# Patient Record
Sex: MALE | Race: BLACK OR AFRICAN AMERICAN | Employment: STUDENT | URBAN - METROPOLITAN AREA
[De-identification: names, ages, dates, MRNs, and addresses within clinical notes are randomized per-mention and may not be internally consistent; named-entity substitution may affect disease eponyms.]

---
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
## 2023-06-13 NOTE — TELEPHONE ENCOUNTER
Please review. Protocol failed / Has no protocol.      Requested Prescriptions   Pending Prescriptions Disp Refills    ALBUTEROL 108 (90 Base) MCG/ACT Inhalation Aero Soln [Pharmacy Med Name: ALBUTEROL HFA INH (200 PUFFS) 8.5GM] 8.5 g 0     Sig: INHALE 1 PUFF INTO THE LUNGS EVERY 6 HOURS AS NEEDED FOR WHEEZING       Asthma & COPD Medication Protocol Failed - 6/12/2023  4:00 PM        Failed - In person appointment or virtual visit in the past 6 mos or appointment in next 3 mos     Recent Outpatient Visits              9 months ago Strain of calf muscle, right, subsequent encounter    [facility]    Office Visit    10 months ago Bilateral impacted cerumen    [facility], [address] [facility],[address] [facility], Ftrk-Dzdvpi-KrjddrhWindy Lerma MD    Office Visit    11 months ago Major depressive disorder, recurrent, mild [facility])    [facility], [address], [facility] iRckey Quarles MD    Office Visit    12 months ago Strain of calf muscle, right, initial encounter    [facility]    Office Visit    1 year ago Abscess    [facility], [address], [facility] Rickey Quarles MD    Office Visit          Future Appointments         Provider Department Appt Notes    In 1 month Carlos Vasquez MD [address], [address] [facility],[address] [facility], [place] 1 year f/u                  Future Appointments         Provider Department Appt Notes    In 1 month Carlos Vasquez MD [address], [address] [facility],[address], [place] 1 year f/u           Recent Outpatient Visits              9 months ago Strain of calf muscle, right, subsequent encounter    [address] [facility] YeaddissCésar egan MD    Office Visit    10 months ago Bilateral impacted cerumen    [address], [address] [facility],[address] [facility], [place] Carlos Vasquez MD    Office Visit    11 months ago Major depressive disorder, recurrent, mild (HCC)    Brenden Kumar MD    Office Visit    12 months ago Strain of calf muscle, right, initial encounter    [address] [facility]    Office Visit    1 year ago Abscess    Brenden Kumar MD    Office Visit

## 2023-06-13 NOTE — ED INITIAL ASSESSMENT (HPI)
Pt to ED with c/o left flank/rib pain after trauma while playing football 2 days ago. Pt states pain with inspiration and any movement. No respiratory distress noted. Pt ambulating by self with steady gait. Chest expansion equal. Bilateral breath sounds noted. Pt is alert and oriented x4. Pt skin parameters WNL.

## 2023-08-04 NOTE — TELEPHONE ENCOUNTER
Refill passed per [facility], [facility] protocol. Requested Prescriptions   Pending Prescriptions Disp Refills    albuterol (2.5 MG/3ML) 0.083% Inhalation Nebu Soln 1 each 3     Sig: Take 3 mL (2.5 mg total) by nebulization every 6 (six) hours as needed for Wheezing. Asthma & COPD Medication Protocol Passed - 8/4/2023 10:12 AM        Passed - In person appointment or virtual visit in the past 6 mos or appointment in next 3 mos     Recent Outpatient Visits              Yesterday Routine health maintenance    [address] [place] [address] Laura Brizuela MD    Office Visit    Yesterday Bilateral impacted cerumen    [facility], [address], Roe Hernandez MD    Office Visit    1 month ago Concussion without loss of consciousness, subsequent encounter    [address], [address], [facility] Laura Brizuela MD    Office Visit    11 months ago Strain of calf muscle, right, subsequent encounter    [address], [address], Gerson Rush MD    Office Visit    1 year ago Bilateral impacted cerumen    [facility], [address], [place] Dominick Lyons MD    Office Visit          Future Appointments         Provider Department Appt Notes    In 3 weeks Laura Brizuela MD [address] [place] [address] two month follow up    In 6 months Dominick Lyons MD [facility], [address], [place] 6 mths                 albuterol 108 (90 Base) MCG/ACT Inhalation Aero Soln 8.5 g 0     Sig: Inhale 1 puff into the lungs every 6 (six) hours as needed for Wheezing.        Asthma & COPD Medication Protocol Passed - 8/4/2023 10:12 AM        Passed - In person appointment or virtual visit in the past 6 mos or appointment in next 3 mos     Recent Outpatient Visits              Yesterday Routine health maintenance    [address] [place] [address], [address] [place] [address], Akosua Machuca MD    Office Visit    Yesterday Bilateral impacted cerumen    [facility], [address], Pavan Hernandez MD    Office Visit    1 month ago Concussion without loss of consciousness, subsequent encounter    [address], [address], [facility] Donald Mullins MD    Office Visit    11 months ago Strain of calf muscle, right, subsequent encounter    Adri Stafford Stewart Coyer, MD    Office Visit    1 year ago Bilateral impacted cerumen    [facility], [address], Pavan Hernandez MD    Office Visit          Future Appointments         Provider Department Appt Notes    In 3 weeks MD Jose Ramon Zaldivar, [facility] two month follow up    In 6 months Darwin Colbert MD [facility], [address], [place] 6 mths                 Budesonide-Formoterol Fumarate 160-4.5 MCG/ACT Inhalation Aerosol 10.2 g 0     Sig: Inhale 2 puffs into the lungs 2 (two) times daily.        Asthma & COPD Medication Protocol Passed - 8/4/2023 10:12 AM        Passed - In person appointment or virtual visit in the past 6 mos or appointment in next 3 mos     Recent Outpatient Visits              Yesterday Routine health maintenance    [address], Cathie Jones MD    Office Visit    Yesterday Bilateral impacted cerumen    [address], [address], Pavan Hernandez MD    Office Visit    1 month ago Concussion without loss of consciousness, subsequent encounter    [address], Cathie Jones MD    Office Visit    11 months ago Strain of calf muscle, right, subsequent encounter    Adri Stafford Stewart Coyer, MD    Office Visit    1 year ago Bilateral impacted cerumen    [address], [address], Krish lAas MD    Office Visit          Future Appointments         Provider Department Appt Notes    In 3 weeks MD Zackery Butler [place] two month follow up    In 6 months Lang Elias MD [facility], [address], [place] 6 mths                 loratadine 10 MG Oral Tab 90 tablet 11     Sig: Take 1 tablet (10 mg total) by mouth daily. Allergy Medication Protocol Passed - 8/4/2023 10:12 AM        Passed - In person appointment or virtual visit in the past 12 mos or appointment in next 3 mos     Recent Outpatient Visits              Yesterday Routine health maintenance    Farrah Panda MD    Office Visit    Yesterday Bilateral impacted cerumen    [facility], [address], [place], Alize Masterson MD    Office Visit    1 month ago Concussion without loss of consciousness, subsequent encounter    [address], [address], [place] Angel Fine MD    Office Visit    11 months ago Strain of calf muscle, right, subsequent encounter    [address], [address], [place], Lorenza Valentine MD    Office Visit    1 year ago Bilateral impacted cerumen    [facility], [address], [place] Lang Elias MD    Office Visit          Future Appointments         Provider Department Appt Notes    In 3 weeks MD Zackery Butler [place] two month follow up    In 6 months Lang Elias MD [facility], [address], [place] 6 mths                 montelukast 10 MG Oral Tab 90 tablet 3     Sig: Take 1 tablet (10 mg total) by mouth nightly.        Asthma & COPD Medication Protocol Passed - 8/4/2023 10:12 AM        Passed - In person appointment or virtual visit in the past 6 mos or appointment in next 3 mos     Recent Outpatient Visits Yesterday Routine health maintenance    [address] Clark Larose MD    Office Visit    Yesterday Bilateral impacted cerumen    [facility], [address], Kaylen Hernandez MD    Office Visit    1 month ago Concussion without loss of consciousness, subsequent encounter    [address], [address], [address] Clark Larose MD    Office Visit    11 months ago Strain of calf muscle, right, subsequent encounter    [address], [address], [place], Nely De La Paz MD    Office Visit    1 year ago Bilateral impacted cerumen    [facility], [address], Kaylen Hernandez MD    Office Visit          Future Appointments         Provider Department Appt Notes    In 3 weeks MD Y[facility]ier Pascual, [facility] two month follow up    In 6 months MD Yunier Valdez, [facility] 6 mths                  Recent Outpatient Visits              Yesterday Routine health maintenance    [address], [address], [address] Clark Larose MD    Office Visit    Yesterday Bilateral impacted cerumen    [facility], [address], Kaylen Hernandez MD    Office Visit    1 month ago Concussion without loss of consciousness, subsequent encounter    [address], [address], [address] Clark Larose MD    Office Visit    11 months ago Strain of calf muscle, right, subsequent encounter    [address], [address], [place], Nely De La Paz MD    Office Visit    1 year ago Bilateral impacted cerumen    [facility], [address], Kaylen Hernandez MD    Office Visit          Future Appointments         Provider Department Appt Notes    In 3 weeks Clark Larose MD [address], [place] [address], [place] [address] two month follow up    In 6 months MD [facility], [address], [place] [facility]

## 2023-08-14 NOTE — TELEPHONE ENCOUNTER
Mother is requesting a copy of patients immunization record. Mother states they are on the way to  from office.

## 2023-08-14 NOTE — TELEPHONE ENCOUNTER
Patient states that he just received notice that he will be moving into the [facility]. Need form completed ASAP. Form placed in dr Haven Conn in box at the [facility]  8-14-23 for dr Johan Peck to sign.

## 2023-08-15 NOTE — TELEPHONE ENCOUNTER
Left message on patients VM, will need Men B vaccine, school is recommending that.  Can get vaccine when he is

## 2023-08-16 NOTE — TELEPHONE ENCOUNTER
Patient is already away at school in [place], mother will pick form up, but will schedule nurse visit to get vaccine. Form placed up at .

## 2023-09-15 NOTE — DISCHARGE INSTRUCTIONS
Your X-rays today do not show any breaks/fractures  We will treat your injury as an ankle and foot sprain, crust injury of foot  Elevate leg when sitting, Rest ankle and foot to help healing process  Continue use of Ortho Boot, until you follow up with Podiatry or Orthopedic Specialist

## 2023-09-15 NOTE — ED INITIAL ASSESSMENT (HPI)
Pt states was seen a week ago for a leg injury in another IC. Pt received minimal information and was unsure of his official status of his injury. Pt states here for reevaluation.

## 2023-10-11 NOTE — TELEPHONE ENCOUNTER
Refill passed per [place] [facility] [place] protocol. Requested Prescriptions   Pending Prescriptions Disp Refills    SERTRALINE 50 MG Oral Tab [Pharmacy Med Name: SERTRALINE 50MG TABLETS] 90 tablet 3     Sig: TAKE 1 TABLET(50 MG) BY MOUTH DAILY       Psychiatric Non-Scheduled (Anti-Anxiety) Passed - 10/10/2023 10:19 AM        Passed - In person appointment or virtual visit in the past 6 mos or appointment in next 3 mos     Recent Outpatient Visits              2 months ago Routine health maintenance    [address], Haja Acosta MD    Office Visit    2 months ago Bilateral impacted [place], [address], [place], Braxton Schwab, MD    Office Visit    3 months ago Concussion without loss of consciousness, subsequent encounter    [address], Haja Acosta MD    Office Visit    1 year ago Strain of calf muscle, right, subsequent encounter    [address] [facility], [place], Shannon Antonio MD    Office Visit    1 year ago Bilateral impacted [place], [address], [place], Braxton Schwab, MD    Office Visit          Future Appointments         Provider Department Appt Notes    In 2 weeks ADO MRI RM1 (1.5T) [facility] - [place] Sched w/mom; athlete injury, use AGA claim info, ask mom about [address] secondary?   MB  Please call Ana Luong at [facility] if any questions:  [phone]   MB    In 4 months Macy Khalil MD [address] 6 mths                         Recent Outpatient Visits              2 months ago Routine health maintenance    Diane Jackson MD    Office Visit    2 months ago Bilateral impacted cerumen    [address], [address], MD Olu    Office Visit    3 months ago Concussion without loss of consciousness, subsequent encounter    Verena Salguero MD    Office Visit    1 year ago Strain of calf muscle, right, subsequent encounter    [place], Lorenza Valentine MD    Office Visit    1 year ago Bilateral impacted Lisa Bound, [address], [place], Alize Masterson MD    Office Visit          Future Appointments         Provider Department Appt Notes    In 2 weeks ADO MRI RM1 (1.5T) [facility] - [place] Sched w/mom; athlete injury, use AGA claim info, ask mom about [address] secondary?   MB  Please call Flumes at [facility] if any questions:  [phone]   MB    In 4 months Lang Elias MD [facility] Medical [facility], [address], [place] 6 mths

## 2023-10-12 NOTE — TELEPHONE ENCOUNTER
I have created this encounter to have an up to date scanning cover sheet for the pt's received report from [facility]. I have sent the report to the scanning team so that it can be uploaded into the pt's chart.

## 2023-10-18 NOTE — TELEPHONE ENCOUNTER
Referral for Dr. Ephraim Redd office was faxed over to [phone], with confirmation, mother was called and notified.

## 2023-10-18 NOTE — TELEPHONE ENCOUNTER
Patient's mom stopped in the [facility]. Rajeev needs a referral to Dr. Osvaldo Sherman, he has an appointment today at 2:00pm. Portia Bean is having pain in both eyes, discharge and blurry vision. He was told by Dr. Juan Francisco Ha office to come in today as an emergency appointment. Can a referral please be issued today and faxed to Dr. Juan Francisco Ha office?

## 2023-10-26 NOTE — ANESTHESIA PROCEDURE NOTES
Airway  Date/Time: 10/26/2023 9:54 AM  Urgency: Elective      General Information and Staff    Patient location during procedure: OR  Anesthesiologist: Lo Reyes MD  Performed: anesthesiologist   Performed by: Lo Reyes MD  Authorized by: Lo Reyes MD      Indications and Patient Condition  Indications for airway management: anesthesia  Sedation level: deep  Preoxygenated: yes  Patient position: sniffing  Mask difficulty assessment: 2 - vent by mask + OA or adjuvant +/- NMBA    Final Airway Details  Final airway type: supraglottic airway      Successful airway: classic  Size 4       Number of attempts at approach: 1  Number of other approaches attempted: 0    Additional Comments  Easy mask, easy LMA insertion

## 2023-10-26 NOTE — OPERATIVE REPORT
Alfonso Marley    PATIENT'S NAME: Ruddy Lee, [address] [facility], [place]   ATTENDING PHYSICIAN: Robb Mejia DO   OPERATING PHYSICIAN: Holly Munoz   PATIENT ACCOUNT#:   [de-identified]    LOCATION:  [address]  MEDICAL RECORD #:   [id number]       YOB: 2005  ADMISSION DATE:       10/26/2023      OPERATION DATE:  10/26/2023    OPERATIVE REPORT      PREOPERATIVE DIAGNOSIS:  Left foot Lisfranc fracture, tarsometatarsal subluxation. POSTOPERATIVE DIAGNOSIS:  Left foot Lisfranc fracture, tarsometatarsal subluxation. PROCEDURE:    1. Open reduction and internal fixation of the tarsometatarsal joint, left side. 2.   Open reduction and internal fixation of the inner tarsal joint, left side. ASSISTANT:  Nixon Pretty PA-C, who was essential in aiding in reduction, assisting in repair, in order to facilitate surgery and preserve the neurovascular bundle. ANESTHESIA:  General with a regional block. ESTIMATED BLOOD LOSS:  5 mL. IMPLANTS:  Synthes 4.0 cannulated headless screw x2. SPECIMENS:  None. COMPLICATIONS:  None. CONDITION:  Stable to PACU. INDICATIONS:  The patient is a 23-year-old male who plays football and injured his foot while playing football. He injured his Lisfranc ligament. He was found to have plantar tearing of the ligament and had continued pain. He failed conservative management including wearing a boot and activity modifications and wished to proceed with surgery. All risks, benefits and alternatives were explained to the patient including, but not limited to, DVT, infection, wound-healing problems, bone-healing problems, neurovascular damage, risk of continued pain, risk of weakness, risk of loss of strength and decreased ability in playing football. The patient understood and wished to proceed with the above procedure.       OPERATIVE TECHNIQUE:  The patient was met in the preoperative holding area and marked with an indelible marker. He was brought back to the operative suite, placed on the operating table in supine position. There, anesthesia was administered by department of anesthesia. Once he was adequately sedated, a well-padded left thigh tourniquet was placed, and he was prepped and draped in the usual sterile fashion. A time-out was performed. All in the room were in agreement with patient, procedure, and site of procedure. Preoperative antibiotics were administered by the department of anesthesia prior to incision. The limb was exsanguinated and tourniquet inflated to 250 mmHg and remained inflated for approximately 33 minutes. LEFT FOOT OPEN REDUCTION AND INTERNAL FIXATION OF THE TARSOMETATARSAL JOINT:  We made a dorsal incision over the Lisfranc joint and bluntly dissected down. Synovitis was removed from the joint, and a reduction was made with a pointed reduction clamp. Once it was well reduced, a K-wire was inserted from the medial aspect of the medial cuneiform into the second metatarsal base. The position was checked under fluoroscopy. Once we liked our position, we made an incision medially; bluntly dissected down; measured, drilled and inserted a 4.0 cannulated headless screw. Excellent stability was found at the joint after this reduction. OPEN REDUCTION AND INTERNAL FIXATION OF INNER TARSAL JOINT:  Through a dorsal incision, we dissected down, and I identified the inner cuneiform joint. The joint was stabilized and pinned in position with a K-wire. The position was checked under fluoroscopy. Once we liked our position, an incision was made medially, and blunt dissection was carried down. We drilled, measured, and inserted a 4.0 cannulated headless screw. Excellent compression was achieved across our joint surface. Under fluoroscopy, the joint was checked for stability and found to be stable.       Our wounds were thoroughly irrigated, and our incisions were closed using 2-0 Vicryl for the retinacular tissue, as well as the subcutaneous tissue, and a running 3-0 Monocryl for subcuticular closure. A sterile dressing was placed consisting of Xeroform, 4 x 4, ABD, Webril, and a bulky Mcfadden sugar-tong splint. The tourniquet had been deflated, and the toes pinked up nicely. The patient was awoken from anesthesia and brought to the PACU in stable condition and will be nonweightbearing through his left lower extremity, receive DVT prophylaxis, and will follow up in the office in 1 week.     Dictated By Deana Roldan DO  d: 10/26/2023 10:43:39  t: 10/26/2023 18:27:53  Job [id number]  ISAI/

## 2023-10-26 NOTE — H&P
[address] [facility] Patient Status:  [facility]patient Surgery    1/15/2005 MRN [id number]   Location [address] [facility] Attending Thang Mom, [address] [facility] Day # 0 PCP Mehrdad Cai MD       Jet Faust is a 19year old male with a history of a left lisfranc injury while playing football. He has failed conservative management and presents today for surgical management. Allergies:   Cat Hair Extract        ANAPHYLAXIS  Egg                     HIVES, UNKNOWN    Comment:Per pt: No longer have an allergy 23 rmv  Peanuts                 HIVES  Peanuts [Peanut Oil]    HIVES  Tree Nuts               HIVES  Dog Dander [Dander]       Dust Mites              OTHER (SEE COMMENTS)    Comment:sneezing  Seasonal                Coughing    Past Medical History:   Diagnosis Date    Asthma     CP (cerebral palsy) (HCC)     mild per mom    CRPS (complex regional pain syndrome type I)     Depression     Environmental and seasonal allergies     Extrinsic asthma, unspecified     Otitis media     S/P myringotomy with insertion of tube ,,       Height 6' 1\" (1.854 m), weight 220 lb (99.8 kg). Review of Systems  Negative other than stated in HPI     Physical Exam  General: Awake and alert, resting comfortably in no acute distress   HEENT: NCAT, EOMI   Respiratory: Non labored breathing   Cardiac: No peripheral edema   GI: Non distended, non tender abdomen   Musculoskeletal: Tenderness to palpation dorsum of left foot at 1st and 2nd tarsometatarsal joints. SILT. DF/PF/EHL intact.     Assessment:  19year old male with left foot tarsometatarsal joint sprain     Plan:  Angelica Burnham to proceed with procedure as planned     Brooks Azul PA-C  10/26/2023

## 2023-10-26 NOTE — ANESTHESIA PROCEDURE NOTES
Peripheral Block    Date/Time: 10/26/2023 9:32 AM    Performed by: Lew Reyes MD  Authorized by: Lew Reyes MD      General Information and Staff    Start Time:  10/26/2023 9:32 AM  End Time:  10/26/2023 9:35 AM  Anesthesiologist:  Lew Reyes MD  Performed by: Anesthesiologist  Patient Location:  PACU    Block Placement: Pre Induction  Site Identification: real time ultrasound guided and image stored and retrievable      Reason for Block: at surgeon's request and post-op pain management    Preanesthetic Checklist: 2 patient identifers, IV checked, site marked, risks and benefits discussed, monitors and equipment checked, pre-op evaluation, timeout performed, anesthesia consent, sterile technique used, no prohibitive neurological deficits and no local skin infection at insertion site      Procedure Details    Patient Position:  Right lateral decubitus  Prep: ChloraPrep and patient draped    Monitoring:  Cardiac monitor  Block Type:  Popliteal  Laterality:  Left  Injection Technique:  Single-shot    Needle    Needle Type:  Short-bevel  Needle Gauge:  22 G  Needle Length:  100 mm  Needle Localization:  Ultrasound guidance and nerve stimulator  Reason for Ultrasound Use: appropriate spread of the medication was noted in real time and no ultrasound evidence of intravascular and/or intraneural injection      Muscle Twitch Response: dorsiflexion      Assessment    Injection Assessment:  Good spread noted, incremental injection, local visualized surrounding nerve on ultrasound, low pressure, negative aspiration for heme and no pain on injection  Paresthesia Pain:  None  Heart Rate Change: No    - Patient tolerated block procedure well without evidence of immediate block related complications.      Medications  10/26/2023 9:32 AM      Additional Comments

## 2023-10-26 NOTE — ANESTHESIA PROCEDURE NOTES
Peripheral Block    Date/Time: 10/26/2023 9:35 AM    Performed by: Jose E Reyes MD  Authorized by: Jose E Reyes MD      General Information and Staff    Start Time:  10/26/2023 9:35 AM  End Time:  10/26/2023 9:37 AM  Anesthesiologist:  Jose E Reyes MD  Performed by: Anesthesiologist  Patient Location:  PACU    Block Placement: Pre Induction  Site Identification: real time ultrasound guided and image stored and retrievable      Reason for Block: at surgeon's request and post-op pain management    Preanesthetic Checklist: 2 patient identifers, IV checked, site marked, risks and benefits discussed, monitors and equipment checked, pre-op evaluation, timeout performed, anesthesia consent, sterile technique used, no prohibitive neurological deficits and no local skin infection at insertion site      Procedure Details    Patient Position:  Supine  Prep: ChloraPrep and patient draped    Monitoring:  Cardiac monitor, blood pressure cuff, continuous pulse ox and heart rate  Block Type:  Saphenous  Laterality:  Left  Injection Technique:  Single-shot    Needle    Needle Type:  Short-bevel  Needle Gauge:  22 G  Needle Length:  100 mm  Needle Localization:  Ultrasound guidance  Reason for Ultrasound Use: appropriate spread of the medication was noted in real time and no ultrasound evidence of intravascular and/or intraneural injection            Assessment    Injection Assessment:  Good spread noted, incremental injection, low pressure, local visualized surrounding nerve on ultrasound, negative aspiration for heme and no pain on injection  Paresthesia Pain:  None  Heart Rate Change: No    - Patient tolerated block procedure well without evidence of immediate block related complications.      Medications  10/26/2023 9:35 AM      Additional Comments

## 2023-10-26 NOTE — BRIEF OP NOTE
Pre-Operative Diagnosis: Tarsometatarsal joint sprain left     Post-Operative Diagnosis: Tarsometatarsal joint sprain left      Procedure Performed:   Left foot open reduction internal fixation intercuneiform and lisfranc joint    Surgeon(s) and Role:     Phyllis Michelle DO - Trudi    Assistant(s):  PA: Yaritza Murrieta PA-C     Surgical Findings: fracture/dislocation     Specimen: n/a     Estimated Blood Loss: 5 cc    Dictation Number:  [id number]    Debby Barajas PA-C  10/26/2023  10:37 AM

## 2024-03-11 NOTE — ED PROVIDER NOTES
Patient Seen in: [facility]      History     Chief Complaint   Patient presents with    Cough/URI     Stated Complaint: Cough/Weakness    Subjective:   19-year-old male with medical conditions as noted below presents with complaints of intermittent nasal congestion and non prod cough x 1 week. States symptoms resolved but then returned yesterday. No improvement from pseudoephedrine. States so of feeling like his heart was racing for a few minutes yesterday.  Resolved on its own.  Mom is concerned for possible blood clot because he recently had hardware in his foot removed, there is also a family history.  Patient returning to college tomorrow and mom wants to make sure everything is okay before he goes back.  No fever/chills, chest pain, shortness of breath, abdominal pain, nausea/vomiting/diarrhea, shortness of breath on exertion            Objective:   Past Medical History:   Diagnosis Date    Asthma (HCC)     CP (cerebral palsy) (HCC)     mild per mom    CRPS (complex regional pain syndrome type I)     Depression     Environmental and seasonal allergies     Extrinsic asthma, unspecified     Otitis media     S/P myringotomy with insertion of tube 2007,2008,2009              Past Surgical History:   Procedure Laterality Date    ADENOIDECTOMY  2007    MASTECTOMY,SUBCUTANEOUS Bilateral 08/18/2020    T&A  2008    TONSILLECTOMY                  Social History     Socioeconomic History    Marital status: Single   Occupational History    Occupation: student    Tobacco Use    Smoking status: Never    Smokeless tobacco: Never   Vaping Use    Vaping Use: Never used   Substance and Sexual Activity    Alcohol use: Never    Drug use: Never   Other Topics Concern    Caffeine Concern No    History of tanning No              Review of Systems   Constitutional:  Negative for chills and fever.   HENT:  Positive for congestion. Negative for sore throat.    Respiratory:  Positive for cough. Negative for shortness of  breath.    Cardiovascular:  Positive for palpitations (x 1 episode that resolved on its own). Negative for chest pain.   Gastrointestinal:  Negative for abdominal pain, diarrhea, nausea and vomiting.   Neurological:  Negative for headaches.   All other systems reviewed and are negative.      Positive for stated complaint: Cough/Weakness  Other systems are as noted in HPI.  Constitutional and vital signs reviewed.      All other systems reviewed and negative except as noted above.    Physical Exam     ED Triage Vitals [03/11/24 1742]   /72   Pulse 75   Resp 18   Temp 97.7 °F (36.5 °C)   Temp src Temporal   SpO2 96 %   O2 Device None (Room air)       Current:/72   Pulse 75   Temp 97.7 °F (36.5 °C) (Temporal)   Resp 18   SpO2 96%         Physical Exam  Vitals and nursing note reviewed.   Constitutional:       General: He is not in acute distress.     Appearance: Normal appearance. He is not ill-appearing.   HENT:      Head: Normocephalic.      Right Ear: Tympanic membrane and external ear normal.      Left Ear: Tympanic membrane and external ear normal.      Nose: Nose normal.      Mouth/Throat:      Mouth: Mucous membranes are moist.      Pharynx: Oropharynx is clear. Uvula midline.      Tonsils: No tonsillar exudate.   Cardiovascular:      Rate and Rhythm: Normal rate and regular rhythm.   Pulmonary:      Effort: Pulmonary effort is normal.      Breath sounds: Normal breath sounds.   Musculoskeletal:         General: Normal range of motion.      Cervical back: Normal range of motion and neck supple.   Skin:     General: Skin is warm and dry.   Neurological:      Mental Status: He is alert and oriented to person, place, and time.   Psychiatric:         Behavior: Behavior is cooperative.               ED Course     Labs Reviewed   D-DIMER (POC) - Normal   RAPID SARS-COV-2 BY PCR - Normal   POCT FLU TEST - Normal    Narrative:     This assay is a rapid molecular in vitro test utilizing nucleic acid  amplification of influenza A and B viral RNA.                      MDM                                         Medical Decision Making  Patient is well-appearing.  Respirations easy nonlabored.  No tachycardia at this time  I discussed differentials with patient including but not limited to viral uri vs PE.  Rapid COVID and Influenza negative  Discussed with patient and mother low suspicion for PE.  Will do a D-dimer and if positive will send patient to emergency department.  D-dimer normal  Push fluids, cool mist humidifier, good hand washing  otc meds prn  Fu with PCP. Return/ ED precautions discussed      Problems Addressed:  Viral URI with cough: acute illness or injury    Amount and/or Complexity of Data Reviewed  Labs: ordered. Decision-making details documented in ED Course.        Disposition and Plan     Clinical Impression:  1. Viral URI with cough         Disposition:  Discharge  3/11/2024  7:34 pm    Follow-up:  Pat Conn MD  [address] [place] [id number]  [phone]    Schedule an appointment as soon as possible for a visit             Medications Prescribed:  Discharge Medication List as of 3/11/2024  7:36 PM

## 2024-03-11 NOTE — ED INITIAL ASSESSMENT (HPI)
Sinus congestion and cough x 1 week.  Heading back to college and wants to be checked before going back.

## 2024-05-23 NOTE — PROGRESS NOTES
Rajeev Traore is a 19 year old male.  Chief Complaint   Patient presents with    Post-Op     F/u surgery on left foot       HPI:   Patient here for follow-up foot surgery on left side.  Patient had an injury during football last fall.  Had a repair still continuing with physical therapy.  Physical therapist feels more physical therapy working the Achilles tendon would be helpful at this time    Current Outpatient Medications   Medication Sig Dispense Refill    VITAMIN D OR Take 1 tablet by mouth daily.      triamcinolone 0.1 % External Ointment Apply 1 Application topically 2 (two) times daily. apply to affected area 453.6 g 1    HYDROcodone-acetaminophen 7.5-325 MG Oral Tab Take 1 tablet by mouth every 6 (six) hours as needed for Pain. (Patient not taking: Reported on 3/11/2024) 28 tablet 0    docusate sodium 100 MG Oral Cap Take 1 capsule (100 mg total) by mouth 2 (two) times daily as needed. (Patient not taking: Reported on 3/11/2024) 20 capsule 0    aspirin 81 MG Oral Tab EC Take 1 tablet (81 mg total) by mouth in the morning and 1 tablet (81 mg total) before bedtime. (Patient not taking: Reported on 3/11/2024) 60 tablet 0    Cyanocobalamin (VITAMIN B 12 OR) Take 1 tablet by mouth daily. (Patient not taking: Reported on 3/11/2024)      sertraline 50 MG Oral Tab Take 1 tablet (50 mg total) by mouth daily. 90 tablet 3    albuterol 108 (90 Base) MCG/ACT Inhalation Aero Soln Inhale 1 puff into the lungs every 6 (six) hours as needed for Wheezing. (Patient not taking: Reported on 3/11/2024) 3 each 3    Budesonide-Formoterol Fumarate 160-4.5 MCG/ACT Inhalation Aerosol Inhale 2 puffs into the lungs 2 (two) times daily. (Patient not taking: Reported on 3/11/2024) 3 each 3    albuterol (2.5 MG/3ML) 0.083% Inhalation Nebu Soln Take 3 mL (2.5 mg total) by nebulization every 6 (six) hours as needed for Wheezing. (Patient not taking: Reported on 3/11/2024) 1 each 3    montelukast 10 MG Oral Tab Take 1 tablet  (10 mg total) by mouth nightly. 90 tablet 3    loratadine 10 MG Oral Tab Take 1 tablet (10 mg total) by mouth daily. 90 tablet 11    fluticasone propionate 50 MCG/ACT Nasal Suspension 2 sprays by Nasal route daily. 16 g 0    Spacer/Aero-Holding Chambers Does not apply Device Use with hfa inhaler as directed (Patient not taking: Reported on 3/11/2024) 1 Device 0      Past Medical History:    Asthma (HCC)    CP (cerebral palsy) (HCC)    mild per mom    CRPS (complex regional pain syndrome type I)    Depression    Environmental and seasonal allergies    Extrinsic asthma, unspecified    Otitis media    S/P myringotomy with insertion of tube      Past Surgical History:   Procedure Laterality Date    Adenoidectomy  2007    Mastectomy,subcutaneous Bilateral 08/18/2020    T&a  2008    Tonsillectomy        Social History:  Social History     Socioeconomic History    Marital status: Single   Occupational History    Occupation: student    Tobacco Use    Smoking status: Never    Smokeless tobacco: Never   Vaping Use    Vaping status: Never Used   Substance and Sexual Activity    Alcohol use: Never    Drug use: Never   Other Topics Concern    Caffeine Concern No    History of tanning No     Social Determinants of Health      [facility]    Social Connections    [facility]    Housing Stability        REVIEW OF SYSTEMS:   GENERAL HEALTH: No fevers, chills, sweats, fatigue  VISION: No recent vision problems, blurry vision or double vision  HEENT: No decreased hearing ear pain nasal congestion or sore throat  SKIN: denies any unusual skin lesions or rashes  RESPIRATORY: denies shortness of breath, cough, wheezing  CARDIOVASCULAR: denies chest pain on exertion, palpitations, swelling in feet  GI: denies abdominal pain and denies heartburn, nausea or vomiting  : No Pain on urination, change in the color  of urine, discharge, urinating frequently  MUS: No back pain, joint pain, muscle pain  NEURO: denies headaches , anxiety, depression    EXAM:   /73 (BP Location: Right arm, Patient Position: Sitting, Cuff Size: large)   Pulse 76   Wt 258 lb (117 kg)   BMI 34.99 kg/m²   GENERAL: well developed, well nourished,in no apparent distress        ASSESSMENT AND PLAN:   1. Injury of left ankle, sequela  Physical therapy referral given to continue physical therapy for pain relief and function  - Physical Therapy Referral - External       The patient indicates understanding of these issues and agrees to the plan.  No follow-ups on file.

## 2024-07-15 NOTE — ED PROVIDER NOTES
Patient Seen in: [facility]      History     Chief Complaint   Patient presents with    Cough     Stated Complaint: cough    Subjective:   HPI    Patient is 19-year-old male with environmental seasonal allergies, extrinsic asthma, accompanied by mother, presenting to immediate care for evaluation of cold/flulike symptoms.  Onset: 3 days.  Associated: Subjective fever, chills, nasal congestion, and nonproductive cough.  Taking Mucinex and [place] for cold symptoms.  Symptoms slightly improving.  Denies any chest pain or shortness of breath.  No lethargy, weakness, confusion.  Mother with recent COVID-19 virus infection 2 weeks ago.  Here for COVID testing.  Not immunocompromise    Objective:   Past Medical History:    Asthma (HCC)    CP (cerebral palsy) (HCC)    mild per mom    CRPS (complex regional pain syndrome type I)    Depression    Environmental and seasonal allergies    Extrinsic asthma, unspecified    Otitis media    S/P myringotomy with insertion of tube              Past Surgical History:   Procedure Laterality Date    Adenoidectomy  2007    Mastectomy,subcutaneous Bilateral 08/18/2020    T&a  2008    Tonsillectomy                  Social History     Socioeconomic History    Marital status: Single   Occupational History    Occupation: student    Tobacco Use    Smoking status: Never    Smokeless tobacco: Never   Vaping Use    Vaping status: Never Used   Substance and Sexual Activity    Alcohol use: Never    Drug use: Never   Other Topics Concern    Caffeine Concern No    History of tanning No     Social Determinants of Health      [facility]    Social Connections    [facility] from [facility], [facility]    Housing Stability              Review of Systems   Constitutional:  Positive for chills and fever.   Respiratory:  Positive for cough. Negative for shortness of breath.     Cardiovascular:  Negative for chest pain.   Gastrointestinal:  Negative for abdominal pain, nausea and vomiting.   Musculoskeletal:  Negative for neck pain and neck stiffness.   Allergic/Immunologic: Negative for immunocompromised state.   Psychiatric/Behavioral:  Negative for confusion.        Positive for stated Chief Complaint: Cough    Other systems are as noted in HPI.  Constitutional and vital signs reviewed.      All other systems reviewed and negative except as noted above.    Physical Exam     ED Triage Vitals [07/15/24 1200]   /89   Pulse 80   Resp 18   Temp 97.7 °F (36.5 °C)   Temp src Oral   SpO2 100 %   O2 Device None (Room air)       Current Vitals:   Vital Signs  BP: 150/89  Pulse: 80  Resp: 18  Temp: 97.7 °F (36.5 °C)  Temp src: Oral    Oxygen Therapy  SpO2: 100 %  O2 Device: None (Room air)            Physical Exam  Vitals and nursing note reviewed.   Constitutional:       General: He is not in acute distress.     Appearance: He is not toxic-appearing.   HENT:      Head: Normocephalic and atraumatic.      Right Ear: Tympanic membrane normal.      Left Ear: Tympanic membrane normal.      Nose: Congestion and rhinorrhea present.      Mouth/Throat:      Mouth: Mucous membranes are moist.      Pharynx: No oropharyngeal exudate.      Comments: Postnasal drip  Eyes:      Conjunctiva/sclera: Conjunctivae normal.   Cardiovascular:      Rate and Rhythm: Normal rate.      Pulses: Normal pulses.   Pulmonary:      Effort: Pulmonary effort is normal. No respiratory distress.      Breath sounds: Normal breath sounds. No wheezing, rhonchi or rales.      Comments: Clear to auscultation bilaterally.  No rales or wheezing  Musculoskeletal:         General: No swelling or tenderness. Normal range of motion.      Cervical back: Normal range of motion. No rigidity.   Skin:     Capillary Refill: Capillary refill takes less than 2 seconds.      Findings: No rash.   Neurological:      General: No focal deficit  present.      Mental Status: He is alert and oriented to person, place, and time.      Motor: No weakness.      Gait: Gait normal.             ED Course     Labs Reviewed   RAPID SARS-COV-2 BY PCR - Abnormal; Notable for the following components:       Result Value    Rapid SARS-CoV-2 by PCR Detected (*)     All other components within normal limits     Results for orders placed or performed during the hospital encounter of 07/15/24   Rapid SARS-CoV-2 by PCR    Collection Time: 07/15/24 12:03 PM    Specimen: Nares; Other   Result Value Ref Range    Rapid SARS-CoV-2 by PCR Detected (A) Not Detected            MDM     Dx: COVID Virus Infection, Initial Encounter  Onset: 3 days  COVID PCR Positive  No severe disease  No hypoxia  Not requiring hospitalization  Lungs: Clear to Auscultation Bilaterally  No chest pain or shortness of breath  Overall well-appearing  Hemodynamically stable  Afebrile  Tolerating PO  Outpatient management  Supportive care  Quarantine/Isolation  Rest  Oral hydration  Motrin/Tylenol as needed for pain/fever/myalgias  Tessalon Mucinex DM for Anti-tussive  Claritin and Flonase for nasal/sinus congestion  Shared decision making- deferred Paxlovid oral antiviral for Covid infection  PCP follow  ED Return precaution  Discharge instructions COVID                                 Medical Decision Making      Disposition and Plan     Clinical Impression:  1. COVID-19 virus infection    2. Encounter for laboratory testing for COVID-19 virus    3. Acute cough         Disposition:  Discharge  7/15/2024 12:28 pm    Follow-up:  Pat Conn MD  [address] [place] [id number]  [phone]                Medications Prescribed:  Current Discharge Medication List        START taking these medications    Details   benzonatate 100 MG Oral Cap Take 1 capsule (100 mg total) by mouth 3 (three) times daily as needed for cough.  Qty: 30 capsule, Refills: 0

## 2024-07-15 NOTE — DISCHARGE INSTRUCTIONS
Motrin/Tylenol as needed for pain/fever/myalgias  Tessalon Mucinex DM for Anti-tussive  Claritin and Flonase for nasal/sinus congestion

## 2024-08-01 NOTE — ED INITIAL ASSESSMENT (HPI)
Patient states he fell today prior to arrival, when asked if LOC or mechanical fall. Patient states \"just lost control, I couldn't get up for 5 minute. Patient also complaining of swelling over left eye. Denies hitting head during fall. No notable swelling or redness around left eye

## 2024-08-01 NOTE — ED PROVIDER NOTES
Patient Seen in: [facility] Emergency Department    History     Chief Complaint   Patient presents with    Headache       HPI    19 year old male here with third day of bifrontal headache, not maximal in onset, not associated with any emesis or vision changes or diplopia.  No neck stiffness.  No trauma.  Nonexertional at onset.  No focal weakness or numbness or paresthesias    History reviewed.   Past Medical History:    Asthma (HCC)    CP (cerebral palsy) (HCC)    mild per mom    CRPS (complex regional pain syndrome type I)    Depression    Environmental and seasonal allergies    Extrinsic asthma, unspecified    Otitis media    S/P myringotomy with insertion of tube       History reviewed.   Past Surgical History:   Procedure Laterality Date    Adenoidectomy  2007    Mastectomy,subcutaneous Bilateral 08/18/2020    T&a  2008    Tonsillectomy           Medications :  (Not in a hospital admission)       Family History   Problem Relation Age of Onset    Diabetes Mother     Asthma Mother     Prostate Cancer Maternal Grandfather     Prostate Cancer Paternal Grandfather     Hypertension Other     Diabetes Other        Smoking Status:   Social History     Socioeconomic History    Marital status: Single   Occupational History    Occupation: student    Tobacco Use    Smoking status: Never    Smokeless tobacco: Never   Vaping Use    Vaping status: Never Used   Substance and Sexual Activity    Alcohol use: Never    Drug use: Never   Other Topics Concern    Caffeine Concern No    History of tanning No       Constitutional and vital signs reviewed.      Social History and Family History elements reviewed from today, pertinent positives to the presenting problem noted.    Physical Exam     ED Triage Vitals [08/01/24 0155]   /81   Pulse 74   Resp 18   Temp 98.2 °F (36.8 °C)   Temp src Oral   SpO2 98 %   O2 Device None (Room air)       All measures to prevent infection transmission during my interaction with the  patient were taken. The patient was already wearing a droplet mask on my arrival to the room. Personal protective equipment was worn throughout the duration of the exam.  Handwashing was performed prior to and after the exam.  Stethoscope and any equipment used during my examination was cleaned with super sani-cloth germicidal wipes following the exam.     Physical Exam    General: NAD  Head: Normocephalic and atraumatic.  Mouth/Throat/Ears/Nose: Oropharynx is clear and moist.   Eyes: Conjunctivae and EOM are normal.   Neck: Normal range of motion. Supple.   Cardiovascular: Normal rate, regular rhythm, normal heart sounds.  Respiratory/Chest: Clear and equal bilaterally. Exhibits no tenderness.  Gastrointestinal: Soft, non-tender, non-distended. Bowel sounds are normal.   Musculoskeletal:No swelling or deformity.   Neurological: Alert and appropriate. No focal deficits. AOx4  CN II-XII grossly intact  5/5 strength: Left  strength, deltoid abduction, achilles, patella  5/5 strength: Right  strength, deltoid abduction, achilles, patella   SILT to bilateral upper and lower extremities   normal gait . Normal FTN, HTS, no dysdiadochokinesis.     Skin: Skin is warm and dry. No pallor.  Psychiatric: Has a normal mood and affect.      ED Course      Labs Reviewed - No data to display    As Interpreted by me    Imaging Results Available and Reviewed while in ED: No results found.  ED Medications Administered:   Medications   ketorolac (Toradol) 30 MG/ML injection 30 mg (30 mg Intramuscular Given 8/1/24 0248)   acetaminophen (Tylenol Extra Strength) tab 1,000 mg (1,000 mg Oral Given 8/1/24 0248)         MDM     Vitals:    08/01/24 0155 08/01/24 0447   BP: 138/81 134/82   Pulse: 74 76   Resp: 18 18   Temp: 98.2 °F (36.8 °C)    TempSrc: Oral    SpO2: 98% 99%   Weight: 133.8 kg    Height: 182.9 cm (6')      *I personally reviewed and interpreted all ED vitals.    Pulse Ox: 98%, Room air, Normal       Medical Decision  Making      Differential Diagnosis/ Diagnostic Considerations: Migraine, tension headache, subarachnoid hemorrhage, meningitis    Complicating Factors: The patient already has does not have any pertinent problems on file. to contribute to the complexity of this ED evaluation.    I reviewed prior chart records including from July 15, 2024.  Patient here with headache inconsistent with subarachnoid hemorrhage or meningitis, no red flags or neurologic deficits or meningismus.  Feels improved after supportive care.  Discharged home with mother who is also comfortable with the discharge plan.  Discharged in stable condition  Prescriptions: Ibuprofen, tylenol OTC for pain    Disposition and Plan     Clinical Impression:  1. Nonintractable headache, unspecified chronicity pattern, unspecified headache type        Disposition:  Discharge    Follow-up:  Pat Conn MD  [address] [place], [place] [id number] [phone]    Schedule an appointment as soon as possible for a visit in 1 day(s)        Medications Prescribed:  Discharge Medication List as of 8/1/2024  3:31 AM

## 2024-08-01 NOTE — ED INITIAL ASSESSMENT (HPI)
Complaints of headache x2 days worsening tonight. Denies any injuries. Hx of concussion 1 year ago.

## 2024-08-14 NOTE — TELEPHONE ENCOUNTER
Mom asking to  a copy of patient's vacccine record and the test he is negative for Sick Cell.  Call her ready for  at  in [facility]  [phone]  Okay to leave a voicemail

## 2024-11-01 NOTE — ED INITIAL ASSESSMENT (HPI)
Patient presents with large abscess to right side of his of his penis. Per patient, area started off as a \" knot\" 3 weeks ago and today began to drain. Wound is pink in color, draining light yellow pus.  Denies fevers, however, also is COVID + and has col Pre-Appointment Document Gathering    Intake Questions:  Does your child have any existing medical conditions or prior hospitalizations? yes  Have they been evaluated in the past either by a clinician, mental health provider, or school? yes  What are you looking for from this evaluation? Re eval with Dr. Villegas        Intake Screeening:  Appointment Type Placement: Neuropsych Return  Wait time quote (if applicable): Scheduled immediately   Rationale/Notes:      *if scheduling with a psychiatry or ASD psychiatry prescriber please fill out MIDBMTM smartphrase to determine if scheduling with MTM is needed*      Logistics:  Patient would like to receive their intake paperwork via doxo  Email consent? yes  What is the patient's preferred language?   Will the family need an ? no    Intake Paperwork Documentation  Document  Date sent to family Date received and sent to scanning   MIDB Demographics []    ROIs to Collect []    ROIs/Consent to communicate as indicated by ROIs to Collect form []    Medical History []    School and Intervention History []    Behavioral and Mental Health History []    Questionnaires (indicate type in the sent/received column)    *Please check for Teacher JERRY before sending teacher forms [] BAS Parent     [] [facility] Teacher*     [] BRIEF Parent     [] BRIEF Teacher*     [] [facility] Parent     [] [facility] Teacher*     [] Other:      Release of Information Collection / Records received  *If records received from a location without an JERRY on file please still document receipt in this chart*  School/Service/Therapist/etc.  Family Returned signed JERRY Sent Request Received/Sent to HIM scanning Where in the chart?

## 2024-11-19 NOTE — ED PROVIDER NOTES
Patient Seen in: [facility]      History     Chief Complaint   Patient presents with    Cough/URI     Stated Complaint: cough,sore throat    Subjective:   20 y/o male with medical conditions as noted below presents with complaints of nasal congestion, sore throat, nonproductive cough x 10 days.  Has been using his inhaler and taking over-the-counter cough medicine without resolution.  No fever/chills, chest pain, difficulty swallowing, abdominal pain, nausea/vomiting/diarrhea.              Objective:     Past Medical History:    Asthma (HCC)    CP (cerebral palsy) (HCC)    mild per mom    CRPS (complex regional pain syndrome type I)    Depression    Environmental and seasonal allergies    Extrinsic asthma, unspecified    Otitis media    S/P myringotomy with insertion of tube              Past Surgical History:   Procedure Laterality Date    Adenoidectomy  2007    Mastectomy,subcutaneous Bilateral 08/18/2020    T&a  2008    Tonsillectomy                  Social History     Socioeconomic History    Marital status: Single   Occupational History    Occupation: student    Tobacco Use    Smoking status: Never    Smokeless tobacco: Never   Vaping Use    Vaping status: Never Used   Substance and Sexual Activity    Alcohol use: Never    Drug use: Never   Other Topics Concern    Caffeine Concern No    History of tanning No     Social Drivers of Health      Received from [facility], [facility]    Social Connections    Received from [facility], [facility]    Housing Stability              Review of Systems   Constitutional:  Negative for chills and fever.   HENT:  Positive for congestion and sore throat.    Respiratory:  Positive for cough and shortness of breath.    Cardiovascular:  Negative for chest pain.   Gastrointestinal:  Negative for abdominal pain, diarrhea, nausea and vomiting.   Neurological:  Negative for headaches.   All  other systems reviewed and are negative.      Positive for stated complaint: cough,sore throat  Other systems are as noted in HPI.  Constitutional and vital signs reviewed.      All other systems reviewed and negative except as noted above.    Physical Exam     ED Triage Vitals   BP 11/19/24 0852 123/74   Pulse 11/19/24 0852 71   Resp 11/19/24 0852 20   Temp 11/19/24 0856 98.2 °F (36.8 °C)   Temp src 11/19/24 0856 Oral   SpO2 11/19/24 0852 98 %   O2 Device 11/19/24 0852 None (Room air)       Current Vitals:   Vital Signs  BP: 123/74  Pulse: 71  Resp: 20  Temp: 98.2 °F (36.8 °C)  Temp src: Oral    Oxygen Therapy  SpO2: 98 %  O2 Device: None (Room air)        Physical Exam  Vitals and nursing note reviewed.   Constitutional:       General: He is not in acute distress.     Appearance: Normal appearance. He is not ill-appearing.   HENT:      Head: Normocephalic.      Right Ear: Tympanic membrane and external ear normal.      Left Ear: Tympanic membrane and external ear normal.      Nose: Nose normal.      Mouth/Throat:      Mouth: Mucous membranes are moist.      Pharynx: Oropharynx is clear. Uvula midline. Posterior oropharyngeal erythema present.      Tonsils: No tonsillar exudate. 1+ on the right. 1+ on the left.   Cardiovascular:      Rate and Rhythm: Normal rate and regular rhythm.   Pulmonary:      Effort: Pulmonary effort is normal.      Breath sounds: Normal breath sounds.   Musculoskeletal:         General: Normal range of motion.      Cervical back: Normal range of motion and neck supple.   Skin:     General: Skin is warm and dry.   Neurological:      Mental Status: He is alert and oriented to person, place, and time.   Psychiatric:         Behavior: Behavior is cooperative.             ED Course     Labs Reviewed   POCT RAPID STREP - Normal   RAPID SARS-COV-2 BY PCR - Normal     XR CHEST PA + LAT CHEST (CPT=71046)   Final Result   PROCEDURE: XR CHEST PA + LAT CHEST (CPT=71046)       COMPARISON: [place]  [facility], XR RIBS WITH CHEST (3 VIEWS), LEFT    (CPT=71101), 6/13/2023, 1:40 PM.       INDICATIONS: Non productive cough for 10 days. Difficulty taking deep    breaths.       TECHNIQUE:   Two views.         FINDINGS:    CARDIAC/VASC: Normal.  No cardiac silhouette abnormality or cardiomegaly.     Unremarkable pulmonary vasculature.     MEDIAST/KATY: No visible mass or adenopathy.    LUNGS/PLEURA: No acute pulmonary disease.   BONES: No fracture or visible bony lesion.    OTHER: Mild curvature dorsal spine may be positional                   =====   CONCLUSION:    Normal chest radiographs.           Dictated by (CST): Rey Cali MD on 11/19/2024 at 9:27 AM        Finalized by (CST): Rey Cali MD on 11/19/2024 at 9:31 AM                               MDM              Medical Decision Making  Patient is well-appearing. In NAD. Resp easy non labored  I discussed differentials with patient including but not limited to viral uri vs viral/strep pharyngitis vs pneumonia.  Rapid COVID and Strep negative  Chest x-ray independently reviewed and discussed with patient.  Negative for pneumonia  Push fluids, cool mist humidifier, warm salt water gargles, good hand washing  Rx albuterol inhaler, prednisone  otc meds prn  Fu with PCP. Return/ ED precautions discussed      Problems Addressed:  Acute cough: acute illness or injury    Amount and/or Complexity of Data Reviewed  Labs: ordered. Decision-making details documented in ED Course.  Radiology: ordered. Decision-making details documented in ED Course.    Risk  OTC drugs.  Prescription drug management.        Disposition and Plan     Clinical Impression:  1. Acute cough    2. Acute viral pharyngitis         Disposition:  Discharge  11/19/2024  9:53 am    Follow-up:  Pat Conn MD  [address] [place] [address]  [phone]                Medications Prescribed:  Discharge Medication List as of 11/19/2024  9:54 AM        START  taking these medications    Details   !! albuterol 108 (90 Base) MCG/ACT Inhalation Aero Soln Inhale 2 puffs into the lungs every 4 to 6 hours as needed for Wheezing., Normal, Disp-1 each, R-0      predniSONE 20 MG Oral Tab Take 2 tablets (40 mg total) by mouth daily for 5 days., Normal, Disp-10 tablet, R-0       !! - Potential duplicate medications found. Please discuss with provider.              Supplementary Documentation:

## 2024-11-19 NOTE — ED INITIAL ASSESSMENT (HPI)
Cough x10 days, non-productive. Denies fevers.    Detail Level: Generalized Detail Level: Simple Detail Level: Detailed

## 2024-11-20 NOTE — TELEPHONE ENCOUNTER
Please review.  Protocol failed / Has no protocol.    Future Appointments   Date Time Provider Department Center   12/11/2024 10:00 AM Pat Conn MD [facility]        Requested Prescriptions   Pending Prescriptions Disp Refills    ALBUTEROL 108 (90 Base) MCG/ACT Inhalation Aero Soln [Pharmacy Med Name: ALBUTEROL HFA INH (200 PUFFS) 18GM] 54 g 1     Sig: INHALE 1 PUFF BY MOUTH INTO THE LUNGS EVERY 6 HOURS AS NEEDED FOR WHEEZING       Asthma & COPD Medication Protocol Failed - 11/20/2024  3:28 PM        Failed - ACT Score greater than or equal to 20                Failed - ACT recorded in the last 12 months                Passed - Appointment in past 6 or next 3 months      Recent Outpatient Visits              6 months ago Injury of left ankle, [facility] Pat Conn MD    Office Visit    1 year ago Routine health maintenance    [facility] Pat Conn MD    Office Visit    1 year [facility]    [facility]    1 year ago Concussion without loss of consciousness, subsequent encounter    [facility] Pat Conn MD    Office Visit    2 years ago Strain of calf muscle, right, [facility]    [facility]          Future Appointments         Provider Department Appt Notes    In 3 weeks Pat Conn MD [facility]                        Future Appointments         Provider Department Appt Notes    In 3 weeks Pat Conn MD [facility]           Recent Outpatient Visits              6 months ago [facility]  Pat Lozano MD    Office Visit    1 year ago Routine health maintenance    [facility] Pat Conn MD    Office Visit    1 year ago Bilateral impacted cerumen    [facility], Rafat Taveras MD    Office Visit    1 year ago Concussion without loss of consciousness, subsequent encounter    [facility] Pat Conn MD    Office Visit    2 years ago Strain of calf muscle, right, subsequent encounter    [facility], Ian Ramos MD    Office Visit

## 2024-12-02 NOTE — ED INITIAL ASSESSMENT (HPI)
Rajeev arrives with complaints of asthma exacerbation x 1 month. He has been seen at urgent care a couple of times. Completed steroids, using inhaler. He states he played in an outdoor football game Saturday and thinks that made his cough worse.   +productive cough. Denies fevers    Lungs CTA in no distress

## 2024-12-02 NOTE — ED PROVIDER NOTES
Patient Seen in: [facility] Emergency Department    History     Chief Complaint   Patient presents with    Asthma       HPI    19-year-old male presents ER with complaint of shortness of breath.  Patient states been having cough and congestion now since the middle of November after a football game.  Patient with a past medical history of asthma.  Patient states he completed a course of prednisone 40 mg but states he still been coughing.  Patient denies any fevers or chills.    History reviewed.   Past Medical History:    Asthma (HCC)    CP (cerebral palsy) (HCC)    mild per mom    CRPS (complex regional pain syndrome type I)    Depression    Environmental and seasonal allergies    Extrinsic asthma, unspecified    Otitis media    S/P myringotomy with insertion of tube       History reviewed.   Past Surgical History:   Procedure Laterality Date    Adenoidectomy  2007    Mastectomy,subcutaneous Bilateral 08/18/2020    T&a  2008    Tonsillectomy           Medications :  Prescriptions Prior to Admission[1]     Family History   Problem Relation Age of Onset    Diabetes Mother     Asthma Mother     Prostate Cancer Maternal Grandfather     Prostate Cancer Paternal Grandfather     Hypertension Other     Diabetes Other        Smoking Status:   Social History     Socioeconomic History    Marital status: Single   Occupational History    Occupation: student    Tobacco Use    Smoking status: Never    Smokeless tobacco: Never   Vaping Use    Vaping status: Never Used   Substance and Sexual Activity    Alcohol use: Never    Drug use: Never   Other Topics Concern    Caffeine Concern No    History of tanning No       ROS  All pertinent positives for the review of systems are mentioned in the HPI  All other organ systems are reviewed and are negative.    Constitutional and vital signs reviewed.      Social History and Family History elements reviewed from today, pertinent positives to the presenting problem noted.    Physical  Exam     ED Triage Vitals [12/02/24 1045]   /75   Pulse 91   Resp 18   Temp 98.3 °F (36.8 °C)   Temp src Temporal   SpO2 98 %   O2 Device None (Room air)       All measures to prevent infection transmission during my interaction with the patient were taken. The patient was already wearing a droplet mask on my arrival to the room. Personal protective equipment including droplet mask, eye protection, and gloves were worn throughout the duration of the exam.  Handwashing was performed prior to and after the exam.  Stethoscope and any equipment used during my examination was cleaned with super sani-cloth germicidal wipes following the exam.     Physical Exam  Vitals and nursing note reviewed.   Constitutional:       Appearance: He is well-developed.   HENT:      Head: Normocephalic and atraumatic.      Right Ear: External ear normal.      Left Ear: External ear normal.      Nose: Nose normal.   Eyes:      Conjunctiva/sclera: Conjunctivae normal.      Pupils: Pupils are equal, round, and reactive to light.   Cardiovascular:      Rate and Rhythm: Normal rate and regular rhythm.      Heart sounds: Normal heart sounds.   Pulmonary:      Effort: Pulmonary effort is normal.      Breath sounds: Examination of the right-lower field reveals decreased breath sounds. Examination of the left-lower field reveals decreased breath sounds. Decreased breath sounds present. No wheezing or rhonchi.   Abdominal:      General: Bowel sounds are normal.      Palpations: Abdomen is soft.   Musculoskeletal:         General: Normal range of motion.      Cervical back: Normal range of motion and neck supple.   Skin:     General: Skin is warm and dry.   Neurological:      Mental Status: He is alert and oriented to person, place, and time.      Deep Tendon Reflexes: Reflexes are normal and symmetric.   Psychiatric:         Behavior: Behavior normal.         Thought Content: Thought content normal.         Judgment: Judgment normal.         ED  Course        Labs Reviewed   SARS-COV-2/FLU A AND B/RSV BY PCR (GENEXPERT) - Normal    Narrative:     This test is intended for the qualitative detection and differentiation of SARS-CoV-2, influenza A, influenza B, and respiratory syncytial virus (RSV) viral RNA in nasopharyngeal or nares swabs from individuals suspected of respiratory viral infection consistent with COVID-19 by their healthcare provider. Signs and symptoms of respiratory viral infection due to SARS-CoV-2, influenza, and RSV can be similar.                                    Test performed using the Xpert Xpress SARS-CoV-2/FLU/RSV (real time RT-PCR)  assay on the GeneXpert instrument, [employer], [place] [address].                   This test is being used under the Food and Drug Administration's Emergency Use Authorization.                                    The authorized Fact Sheet for Healthcare Providers for this assay is available upon request from the laboratory.         Imaging Results Available and Reviewed while in ED: XR CHEST AP PORTABLE  (CPT=71045)    Result Date: 12/2/2024  CONCLUSION: Normal examination.     Dictated by (CST): Ga Dixon MD on 12/02/2024 at 12:07 PM     Finalized by (CST): Ga Dixon MD on 12/02/2024 at 12:08 PM         ED Medications Administered:   Medications   ipratropium-albuterol (Duoneb) 0.5-2.5 (3) MG/3ML inhalation solution 6 mL (6 mL Nebulization Given 12/2/24 1134)         MDM     Vitals:    12/02/24 1045 12/02/24 1303   BP: 117/75 120/83   Pulse: 91 90   Resp: 18    Temp: 98.3 °F (36.8 °C)    TempSrc: Temporal    SpO2: 98% 98%   Weight: 129.3 kg    Height: 185.4 cm (6' 1\")      *I personally reviewed and interpreted all ED vitals.  I also personally reviewed all labs and imaging if ordered    Pulse Ox: 98%, Room air, Normal     Monitor Interpretation:   normal sinus rhythm    Differential Diagnosis/ Diagnostic Considerations: Asthma exacerbation, bronchitis, URI, pneumonia    Medical Record  Review: I personally reviewed available prior medical records for any recent pertinent discharge summaries, testing, and procedures and reviewed those reports.    Complicating Factors: The patient already has does not have any pertinent problems on file. to contribute to the complexity of this ED evaluation.    Medical Decision Making  19-year-old male presents ER with complaint of shortness of breath.  Patient Genex were negative.  Patient's chest x-ray shows no acute cardiopulmonary issues.  Patient discharged home with Zithromax as well as albuterol inhaler and nebulizer solution.  Patient also given prednisone for For the next 5 days.  Patient given a nebulizer machine by respiratory and mother instructed to follow-up with pulmonology if symptoms continue.      Problems Addressed:  Acute asthmatic bronchitis (HCC): acute illness or injury    Amount and/or Complexity of Data Reviewed  Independent Historian: parent     Details: Patient's mother states he been using Robitussin DM at home with no relief of his cough.  External Data Reviewed: labs, radiology and notes.     Details: Patient notes and labs reviewed from November 2024.  Patient was seen at urgent care and negative for COVID-19 at that time as well as negative chest x-ray.  Labs: ordered. Decision-making details documented in ED Course.  Radiology: ordered and independent interpretation performed. Decision-making details documented in ED Course.     Details: Chest x-ray reviewed by myself shows no acute cardiopulmonary issues.        Condition upon leaving the department: Stable    Disposition and Plan     Clinical Impression:  1. Acute asthmatic bronchitis (HCC)        Disposition:  Discharge    Follow-up:  Pat Conn MD  [address] [place] [id number]  [phone]    Schedule an appointment as soon as possible for a visit  If symptoms worsen      Medications Prescribed:  Current Discharge Medication List        START taking these  medications    Details   azithromycin (ZITHROMAX Z-REMINGTON) 250 MG Oral Tab Take 2 tablets (500 mg total) by mouth daily for 1 day, THEN 1 tablet (250 mg total) daily for 4 days.  Qty: 6 tablet, Refills: 0      !! albuterol 108 (90 Base) MCG/ACT Inhalation Aero Soln Inhale 2 puffs into the lungs every 4 (four) hours as needed.  Qty: 1 each, Refills: 0      albuterol (2.5 MG/3ML) 0.083% Inhalation Nebu Soln Take 3 mL (2.5 mg total) by nebulization every 4 (four) hours as needed for Wheezing or Shortness of Breath.  Qty: 30 each, Refills: 0       !! - Potential duplicate medications found. Please discuss with provider.                   [1] (Not in a hospital admission)

## 2024-12-02 NOTE — ED QUICK NOTES
Patient received neb machine from respiratory.    Patient educated by this RN and respiratory on how to use machine.    Patient educated by this RN on medications for neb machine and discharge instructions.

## 2024-12-11 NOTE — PROGRESS NOTES
Rajeev Traore is a 19 year old male.  Chief Complaint   Patient presents with    Routine Physical     Pt is here for annual px. Pt has concerns on right knee & lingering cough for 1 month, sore throat for 1 month       HPI:   Patient is a 19-year-old male presents today for routine physical exam patient has had a lingering cough since an infection 4 weeks ago.  Patient had stopped taking his loratadine, Singulair, and Symbicort.    Current Outpatient Medications   Medication Sig Dispense Refill    loratadine 10 MG Oral Tab Take 1 tablet (10 mg total) by mouth daily. 90 tablet 11    montelukast 10 MG Oral Tab Take 1 tablet (10 mg total) by mouth nightly. 90 tablet 3    Budesonide-Formoterol Fumarate (SYMBICORT) 160-4.5 MCG/ACT Inhalation Aerosol Inhale 2 puffs into the lungs 2 (two) times daily. 10.2 g 11    albuterol 108 (90 Base) MCG/ACT Inhalation Aero Soln Inhale 2 puffs into the lungs every 4 (four) hours as needed. 1 each 0    albuterol (2.5 MG/3ML) 0.083% Inhalation Nebu Soln Take 3 mL (2.5 mg total) by nebulization every 4 (four) hours as needed for Wheezing or Shortness of Breath. 30 each 0    albuterol 108 (90 Base) MCG/ACT Inhalation Aero Soln Inhale 1 puff into the lungs every 6 (six) hours as needed for Wheezing. 54 g 1    VITAMIN D OR Take 1 tablet by mouth daily.        Past Medical History:    Asthma (HCC)    CP (cerebral palsy) (HCC)    mild per mom    CRPS (complex regional pain syndrome type I)    Depression    Environmental and seasonal allergies    Extrinsic asthma, unspecified    Otitis media    S/P myringotomy with insertion of tube      Past Surgical History:   Procedure Laterality Date    Adenoidectomy  2007    Mastectomy,subcutaneous Bilateral 08/18/2020    T&a  2008    Tonsillectomy        Social History:  Social History     Socioeconomic History    Marital status: Single   Occupational History    Occupation: student    Tobacco Use    Smoking status: Never    Smokeless  tobacco: Never   Vaping Use    Vaping status: Never Used   Substance and Sexual Activity    Alcohol use: Never    Drug use: Never   Other Topics Concern    Caffeine Concern No    History of tanning No     Social Drivers of Health      [facility]    Social Connections    Received from [facility], [facility]    Housing Stability        REVIEW OF SYSTEMS:   GENERAL HEALTH: No fevers, chills, sweats, fatigue  VISION: No recent vision problems, blurry vision or double vision  HEENT: No decreased hearing ear pain nasal congestion or sore throat  SKIN: denies any unusual skin lesions or rashes  RESPIRATORY: denies shortness of breath, cough, wheezing  CARDIOVASCULAR: denies chest pain on exertion, palpitations, swelling in feet  GI: denies abdominal pain and denies heartburn, nausea or vomiting  : No Pain on urination, change in the color of urine, discharge, urinating frequently  MUS: No back pain, joint pain, muscle pain  NEURO: denies headaches , anxiety, depression    EXAM:   /77 (BP Location: Right arm, Patient Position: Sitting, Cuff Size: large)   Pulse 86   Temp 98 °F (36.7 °C) (Temporal)   Wt 269 lb (122 kg)   SpO2 96%   BMI 35.49 kg/m²   GENERAL: well developed, well nourished,in no apparent distress  SKIN: no rashes,no suspicious lesions  HEENT: atraumatic, normocephalic,ears and throat are clear,   NECK: supple,no adenopathy,  LUNGS: clear to auscultation, no wheeze  CARDIO: RRR without murmur  GI: good BS's,no masses or tenderness  EXTREMITIES: no cyanosis, or edema    ASSESSMENT AND PLAN:   1. Routine health maintenance  Immunizations today.  Discussed diet and exercise with patient today.  Labs today.  - TETANUS, DIPHTHERIA TOXOIDS AND ACELLULAR PERTUSIS VACCINE (TDAP), >7 YEARS, IM USE  - Fluzone trivalent vaccine, PF 0.5mL, 6mo+ (31141)  - CBC W Differential W Platelet [E]; Future  - Lipid  Panel [E]; Future  - Comp Metabolic Panel (14) [E]; Future    2. Mild persistent asthma, unspecified whether complicated (HCC)  Restarted loratadine, Singulair, Symbicort recheck in 4 weeks if not improved  - loratadine 10 MG Oral Tab; Take 1 tablet (10 mg total) by mouth daily.  Dispense: 90 tablet; Refill: 11  - montelukast 10 MG Oral Tab; Take 1 tablet (10 mg total) by mouth nightly.  Dispense: 90 tablet; Refill: 3  - Budesonide-Formoterol Fumarate (SYMBICORT) 160-4.5 MCG/ACT Inhalation Aerosol; Inhale 2 puffs into the lungs 2 (two) times daily.  Dispense: 10.2 g; Refill: 11    3. Environmental and seasonal allergies  Loratadine and Singulair refilled  - loratadine 10 MG Oral Tab; Take 1 tablet (10 mg total) by mouth daily.  Dispense: 90 tablet; Refill: 11  - montelukast 10 MG Oral Tab; Take 1 tablet (10 mg total) by mouth nightly.  Dispense: 90 tablet; Refill: 3       The patient indicates understanding of these issues and agrees to the plan.  No follow-ups on file.

## 2025-01-29 NOTE — TELEPHONE ENCOUNTER
Talking with patient's mother.  Confirmed patient's name and .    Mom states patient was scheduled with Dr. Conn today.  Mom advised that Dr. Conn will not be in the office today.  Patient rescheduled for Wednesday, .  Mom states she also has appointment that day at 11:40.  Wondering if son can come at the same time

## 2025-02-06 NOTE — PROGRESS NOTES
Rajeev Traore is a 20 year old male.  Chief Complaint   Patient presents with    Follow - Up       HPI:   Patient is a 20-year-old male presents today continuing to have cough.    Current Outpatient Medications   Medication Sig Dispense Refill    albuterol 108 (90 Base) MCG/ACT Inhalation Aero Soln Inhale 1 puff into the lungs every 6 (six) hours as needed for Wheezing. 54 g 1    Budesonide-Formoterol Fumarate (SYMBICORT) 160-4.5 MCG/ACT Inhalation Aerosol Inhale 2 puffs into the lungs 2 (two) times daily. 10.2 g 11    loratadine 10 MG Oral Tab Take 1 tablet (10 mg total) by mouth daily. 90 tablet 11    montelukast 10 MG Oral Tab Take 1 tablet (10 mg total) by mouth nightly. 90 tablet 3    VITAMIN D OR Take 1 tablet by mouth daily.        Past Medical History:    Asthma (HCC)    CP (cerebral palsy) (HCC)    mild per mom    CRPS (complex regional pain syndrome type I)    Depression    Environmental and seasonal allergies    Extrinsic asthma, unspecified    Otitis media    S/P myringotomy with insertion of tube      Past Surgical History:   Procedure Laterality Date    Adenoidectomy  2007    Mastectomy,subcutaneous Bilateral 08/18/2020    T&a  2008    Tonsillectomy        Social History:  Social History     Socioeconomic History    Marital status: Single   Occupational History    Occupation: student    Tobacco Use    Smoking status: Never     Passive exposure: Never    Smokeless tobacco: Never   Vaping Use    Vaping status: Never Used   Substance and Sexual Activity    Alcohol use: Never    Drug use: Never   Other Topics Concern    Caffeine Concern No    History of tanning No     Social Drivers of Health      Received from [facility], [facility]    Housing Stability        REVIEW OF SYSTEMS:   GENERAL HEALTH: No fevers, chills, sweats, fatigue  VISION: No recent vision problems, blurry vision or double vision  HEENT: No decreased hearing ear pain nasal congestion  or sore throat  SKIN: denies any unusual skin lesions or rashes  RESPIRATORY: denies shortness of breath, cough, wheezing  CARDIOVASCULAR: denies chest pain on exertion, palpitations, swelling in feet  GI: denies abdominal pain and denies heartburn, nausea or vomiting  : No Pain on urination, change in the color of urine, discharge, urinating frequently  MUS: No back pain, joint pain, muscle pain  NEURO: denies headaches , anxiety, depression    EXAM:   /69   Pulse 71   Temp 98.3 °F (36.8 °C) (Temporal)   Resp 16   Wt 268 lb (121.6 kg)   SpO2 97%   BMI 35.36 kg/m²   GENERAL: well developed, well nourished,in no apparent distress  SKIN: no rashes,no suspicious lesions  HEENT: atraumatic, normocephalic,ears and throat are clear,   NECK: supple,no adenopathy,  LUNGS: clear to auscultation, no wheeze  CARDIO: RRR without murmur  GI: good BS's,no masses or tenderness  EXTREMITIES: no cyanosis, or edema    ASSESSMENT AND PLAN:   1. PND (post-nasal drip)  Have asked patient to double up on his loratadine for now.  Due to humidifier for his room.    2. Mild persistent asthma, unspecified whether complicated (HCC)  Refilled all meds.  - albuterol 108 (90 Base) MCG/ACT Inhalation Aero Soln; Inhale 1 puff into the lungs every 6 (six) hours as needed for Wheezing.  Dispense: 54 g; Refill: 1  - Budesonide-Formoterol Fumarate (SYMBICORT) 160-4.5 MCG/ACT Inhalation Aerosol; Inhale 2 puffs into the lungs 2 (two) times daily.  Dispense: 10.2 g; Refill: 11  - loratadine 10 MG Oral Tab; Take 1 tablet (10 mg total) by mouth daily.  Dispense: 90 tablet; Refill: 11  - montelukast 10 MG Oral Tab; Take 1 tablet (10 mg total) by mouth nightly.  Dispense: 90 tablet; Refill: 3    3. Environmental and seasonal allergies  Refilled Singulair and loratadine told him to double up on loratadine  - loratadine 10 MG Oral Tab; Take 1 tablet (10 mg total) by mouth daily.  Dispense: 90 tablet; Refill: 11  - montelukast 10 MG Oral Tab; Take  1 tablet (10 mg total) by mouth nightly.  Dispense: 90 tablet; Refill: 3  I spent 30 minutes with patient today.  We discussed all signs and symptoms, did a physical exam, made a therapeutic plan, refilled all medications, and reviewed all previous visits.    The patient indicates understanding of these issues and agrees to the plan.  No follow-ups on file.

## 2025-02-07 NOTE — TELEPHONE ENCOUNTER
No Prior Auth Required for Symbicort 160-4.5 MCG/ACT Aerosol:    Pharmacy notified; will inform patient he can  medication.

## 2025-02-07 NOTE — TELEPHONE ENCOUNTER
Prior authorization for budesonide-formoterol fumarate was done through [employer]. It can take 1-5 business days for a decision to come back

## 2025-02-23 NOTE — ED INITIAL ASSESSMENT (HPI)
Care Management Follow Up    Length of Stay (days): 6    Expected Discharge Date: 02/24/2025     Concerns to be Addressed: Care progression - discharge planning     Patient plan of care discussed at interdisciplinary rounds: Yes    Anticipated Discharge Disposition:  Therapy rec Transitional care     Anticipated Discharge Services:  Transitional care  Anticipated Discharge DME:  NA    Patient/family educated on Medicare website which has current facility and service quality ratings:  Yes  Education Provided on the Discharge Plan:  Yes per team  Patient/Family in Agreement with the Plan:  Yes    Referrals Placed by CM/SW:  Yes  Private pay costs discussed: transportation costs    Discussed  Partnership in Safe Discharge Planning  document with patient/family: Yes: patient's daughterCheryl    Handoff Completed: No, handoff not indicated or clinically appropriate    Additional Information:  Met with patient at bedside regarding transportation for tomorrow to [facility] and patient prefer [employer] [facility].  Discussed out of pocket cost of [facility] medical transportation by wheelchair with patient. Patient agreed with the plan to have transportation arranged by [employer] [facility] transport.      Called [employer] [facility] to set up a WC ride for tomorrow between 6718-2013.  Patient in agreement.    Sent a message to update Dr. William  Called and left a message to update patient's daughterCheryl  Sent a message to [facility]    Social Hx:  Assessment: Follow. Patient/spouse currently renting a senior living apartment in [facility] thru the end of March 2025. Had a stroke w/right sided residual weakness last April 2024. Needs assist w/ADLs and dependent w/IADLs. Has walker, cane and NuStep. No community resources.  Last note: 02/23/25  Plan: Accepted to [facility]. PAS completed.  Needs: Medical progression  Hand off sent: NA  Transport: [employer] [facility] 2/24/25  Pt with cough, congestion, feeling feverish and runny nose x2 days; denies fever, HA, or n/v/d; hx asthma   between [id number]    Next Steps: RNCM to follow for medical progression, recommendations, and final discharge plan.     Amy Felder RN

## 2025-03-05 NOTE — PROGRESS NOTES
Rajeev Traore is a 20 year old male.  Chief Complaint   Patient presents with    Follow - Up     Pt is here for 1 month follow up - pt reports progress in cough although still lingering       HPI:   Patient is a 20-year-old male who presents for follow-up for cough.  Has doubled up on his loratadine cough is improved but is still there slightly    Current Outpatient Medications   Medication Sig Dispense Refill    albuterol 108 (90 Base) MCG/ACT Inhalation Aero Soln Inhale 1 puff into the lungs every 6 (six) hours as needed for Wheezing. 54 g 1    Budesonide-Formoterol Fumarate (SYMBICORT) 160-4.5 MCG/ACT Inhalation Aerosol Inhale 2 puffs into the lungs 2 (two) times daily. 10.2 g 11    montelukast 10 MG Oral Tab Take 1 tablet (10 mg total) by mouth nightly. 90 tablet 3    loratadine 10 MG Oral Tab Take 1 tablet (10 mg total) by mouth daily. 90 tablet 11    VITAMIN D OR Take 1 tablet by mouth daily.        Past Medical History:    Asthma (HCC)    CP (cerebral palsy) ([facility])    mild per mom    CRPS (complex regional pain syndrome type I)    Depression    Environmental and seasonal allergies    Extrinsic asthma, unspecified    Otitis media    S/P myringotomy with insertion of tube      Past Surgical History:   Procedure Laterality Date    Adenoidectomy  2007    Mastectomy,subcutaneous Bilateral 08/18/2020    T&a  2008    Tonsillectomy        Social History:  Social History     Socioeconomic History    Marital status: Single   Occupational History    Occupation: student    Tobacco Use    Smoking status: Never     Passive exposure: Never    Smokeless tobacco: Never   Vaping Use    Vaping status: Never Used   Substance and Sexual Activity    Alcohol use: Never    Drug use: Never   Other Topics Concern    Caffeine Concern No    History of tanning No     Social Drivers of Health      [facility]    Housing Stability        REVIEW OF SYSTEMS:    GENERAL HEALTH: No fevers, chills, sweats, fatigue  VISION: No recent vision problems, blurry vision or double vision  HEENT: No decreased hearing ear pain nasal congestion or sore throat  SKIN: denies any unusual skin lesions or rashes  RESPIRATORY: denies shortness of breath, cough, wheezing  CARDIOVASCULAR: denies chest pain on exertion, palpitations, swelling in feet  GI: denies abdominal pain and denies heartburn, nausea or vomiting  : No Pain on urination, change in the color of urine, discharge, urinating frequently  MUS: No back pain, joint pain, muscle pain  NEURO: denies headaches , anxiety, depression    EXAM:   /72 (BP Location: Left arm, Patient Position: Sitting, Cuff Size: adult)   Pulse 85   Temp 98 °F (36.7 °C) (Temporal)   Resp 18   Wt 266 lb (120.7 kg)   SpO2 99%   BMI 35.09 kg/m²   GENERAL: well developed, well nourished,in no apparent distress  SKIN: no rashes,no suspicious lesions  HEENT: atraumatic, normocephalic,ears and throat are clear,   NECK: supple,no adenopathy,  LUNGS: clear to auscultation, no wheeze  CARDIO: RRR without murmur  GI: good BS's,no masses or tenderness  EXTREMITIES: no cyanosis, or edema    ASSESSMENT AND PLAN:   1. Mild persistent asthma, unspecified whether complicated (HCC)  Refills given  - albuterol 108 (90 Base) MCG/ACT Inhalation Aero Soln; Inhale 1 puff into the lungs every 6 (six) hours as needed for Wheezing.  Dispense: 54 g; Refill: 1  - Budesonide-Formoterol Fumarate (SYMBICORT) 160-4.5 MCG/ACT Inhalation Aerosol; Inhale 2 puffs into the lungs 2 (two) times daily.  Dispense: 10.2 g; Refill: 11  - montelukast 10 MG Oral Tab; Take 1 tablet (10 mg total) by mouth nightly.  Dispense: 90 tablet; Refill: 3  - loratadine 10 MG Oral Tab; Take 1 tablet (10 mg total) by mouth daily.  Dispense: 90 tablet; Refill: 11    2. Environmental and seasonal allergies  Patient to take 2 doses of loratadine daily including the Singulair  - montelukast 10 MG Oral  Tab; Take 1 tablet (10 mg total) by mouth nightly.  Dispense: 90 tablet; Refill: 3  - loratadine 10 MG Oral Tab; Take 1 tablet (10 mg total) by mouth daily.  Dispense: 90 tablet; Refill: 11    3. Other cough  Continue to take double dose of loratadine until cough clears  I spent 30 minutes with patient both reviewing his last encounter, discussing medications, doing a physical exam, discussing therapeutic plan and doing documentation       The patient indicates understanding of these issues and agrees to the plan.  No follow-ups on file.

## 2025-04-23 NOTE — PROGRESS NOTES
Rajeev Traore is a 20 year old male.  Chief Complaint   Patient presents with    Follow - Up     Pt is here to follow up on asthma       HPI:   Patient took loratadine 2 pills a day now allergies are improved taking 1 a day.  Rarely using his albuterol    Current Medications[1]   Past Medical History[2]   Past Surgical History[3]   Social History:  Short Social Hx on File[4]     REVIEW OF SYSTEMS:   GENERAL HEALTH: No fevers, chills, sweats, fatigue  VISION: No recent vision problems, blurry vision or double vision  HEENT: No decreased hearing ear pain nasal congestion or sore throat  SKIN: denies any unusual skin lesions or rashes  RESPIRATORY: denies shortness of breath, cough, wheezing  CARDIOVASCULAR: denies chest pain on exertion, palpitations, swelling in feet  GI: denies abdominal pain and denies heartburn, nausea or vomiting  : No Pain on urination, change in the color of urine, discharge, urinating frequently  MUS: No back pain, joint pain, muscle pain  NEURO: denies headaches , anxiety, depression    EXAM:   /88 (BP Location: Left arm, Patient Position: Sitting, Cuff Size: large)   Pulse 88   Temp 97.9 °F (36.6 °C) (Temporal)   Resp 18   Ht 5' 11\" (1.803 m)   Wt 275 lb (124.7 kg)   SpO2 99%   BMI 38.35 kg/m²   GENERAL: well developed, well nourished,in no apparent distress  SKIN: no rashes,no suspicious lesions  HEENT: atraumatic, normocephalic,ears and throat are clear,   NECK: supple,no adenopathy,  LUNGS: clear to auscultation, no wheeze  CARDIO: RRR without murmur  GI: good BS's,no masses or tenderness  EXTREMITIES: no cyanosis, or edema    ASSESSMENT AND PLAN:   1. Mild persistent asthma, unspecified whether complicated (HCC)  Discussed medications with patient refilled all medications.  Patient is to take 1 loratadine daily unless allergy symptoms worsen then to bump to 2.  Patient to call if using albuterol more than twice a week.  - albuterol 108 (90 Base) MCG/ACT  Inhalation Aero Soln; Inhale 1 puff into the lungs every 6 (six) hours as needed for Wheezing.  Dispense: 54 g; Refill: 1  - Budesonide-Formoterol Fumarate (SYMBICORT) 160-4.5 MCG/ACT Inhalation Aerosol; Inhale 2 puffs into the lungs 2 (two) times daily.  Dispense: 10.2 g; Refill: 11  - montelukast 10 MG Oral Tab; Take 1 tablet (10 mg total) by mouth nightly.  Dispense: 90 tablet; Refill: 3  - loratadine 10 MG Oral Tab; Take 1 tablet (10 mg total) by mouth daily.  Dispense: 90 tablet; Refill: 11    2. Environmental and seasonal allergies  Singulair and loratadine refill  - montelukast 10 MG Oral Tab; Take 1 tablet (10 mg total) by mouth nightly.  Dispense: 90 tablet; Refill: 3  - loratadine 10 MG Oral Tab; Take 1 tablet (10 mg total) by mouth daily.  Dispense: 90 tablet; Refill: 11  I spent 30 minutes with the patient both discussing medications, signs and symptoms, doing a brief physical exam documentation and making a therapeutic plan    The patient indicates understanding of these issues and agrees to the plan.  No follow-ups on file.       [1]   Current Outpatient Medications   Medication Sig Dispense Refill    albuterol 108 (90 Base) MCG/ACT Inhalation Aero Soln Inhale 1 puff into the lungs every 6 (six) hours as needed for Wheezing. 54 g 1    Budesonide-Formoterol Fumarate (SYMBICORT) 160-4.5 MCG/ACT Inhalation Aerosol Inhale 2 puffs into the lungs 2 (two) times daily. 10.2 g 11    montelukast 10 MG Oral Tab Take 1 tablet (10 mg total) by mouth nightly. 90 tablet 3    loratadine 10 MG Oral Tab Take 1 tablet (10 mg total) by mouth daily. 90 tablet 11    VITAMIN D OR Take 1 tablet by mouth daily.     [2]   Past Medical History:   Asthma (HCC)    CP (cerebral palsy) (HCC)    mild per mom    CRPS (complex regional pain syndrome type I)    Depression    Environmental and seasonal allergies    Extrinsic asthma, unspecified    Otitis media    S/P myringotomy with insertion of tube   [3]   Past Surgical  History:  Procedure Laterality Date    Adenoidectomy  2007    Mastectomy,subcutaneous Bilateral 08/18/2020    T&a  2008    Tonsillectomy     [4]   Social History  Socioeconomic History    Marital status: Single   Occupational History    Occupation: student    Tobacco Use    Smoking status: Never     Passive exposure: Never    Smokeless tobacco: Never   Vaping Use    Vaping status: Never Used   Substance and Sexual Activity    Alcohol use: Never    Drug use: Never   Other Topics Concern    Caffeine Concern No    History of tanning No     Social Drivers of Health      Received from [facility]    Housing Stability

## 2025-07-19 NOTE — PROGRESS NOTES
Subjective:   Rajeev Traore is a 20 year old male who presents to [facility] care.     Last annual exam 12/11/2024 - UTD    PMHx:  #asthma: Symbicort, Singulair, albuterol  #allergies: loratadine  #complex regional pain syndrome - had an injury to L foot with basketball when he was in 5th grade. No surgery was not done, managed with PT for about 2 years. Last year L foot was re-injured and did require surgery. L ankle now is back to baseline, no pain.  #R knee injury 11/2025 follows with ortho and PT. Now 3x/week. Pending clearance to return to football     History/Other:    Chief Complaint Reviewed and Verified  Nursing Notes Reviewed and   Verified  Tobacco Reviewed  Allergies Reviewed  Medications Reviewed         Tobacco:  He has never smoked tobacco.    Current Medications[1]        Objective:   /75   Pulse 87   Temp 98 °F (36.7 °C) (Oral)   Resp 18   Ht 5' 11\" (1.803 m)   Wt 268 lb (121.6 kg)   SpO2 97%   BMI 37.38 kg/m²  Estimated body mass index is 37.38 kg/m² as calculated from the following:    Height as of this encounter: 5' 11\" (1.803 m).    Weight as of this encounter: 268 lb (121.6 kg).  Physical Exam  Constitutional:       General: He is not in acute distress.  Pulmonary:      Effort: Pulmonary effort is normal.   Musculoskeletal:         General: Signs of injury present.   Neurological:      Mental Status: He is alert and oriented to person, place, and time. Mental status is at baseline.   Psychiatric:         Mood and Affect: Mood normal.         Behavior: Behavior normal.         Thought Content: Thought content normal.           Assessment & Plan:   1. Chronic pain of right knee (Primary)  Assessment & Plan:  S/p injury from football in 11/2024   MRI and R knee surgery did not provide answers, per patient/mom  Currently in PT  Aware should avoid football at this time  2. Complex regional pain syndrome type 1 of left lower extremity  Assessment & Plan:  Due to L  ankle injury - stable, at baseline now   3. Mild persistent asthma, unspecified whether complicated (HCC)  -     Albuterol Sulfate HFA; Inhale 1 puff into the lungs every 6 (six) hours as needed for Wheezing.  Dispense: 54 g; Refill: 1  -     Budesonide-Formoterol Fumarate; Inhale 2 puffs into the lungs 2 (two) times daily.  Dispense: 10.2 g; Refill: 11  -     Montelukast Sodium; Take 1 tablet (10 mg total) by mouth nightly.  Dispense: 90 tablet; Refill: 3  -     Loratadine; Take 1 tablet (10 mg total) by mouth daily.  Dispense: 90 tablet; Refill: 11  4. Environmental and seasonal allergies  -     Montelukast Sodium; Take 1 tablet (10 mg total) by mouth nightly.  Dispense: 90 tablet; Refill: 3  -     Loratadine; Take 1 tablet (10 mg total) by mouth daily.  Dispense: 90 tablet; Refill: 11        Return in about 5 months (around 12/21/2025).    Yulia Kim MD, 7/19/2025, 11:08 AM          [1]   Current Outpatient Medications   Medication Sig Dispense Refill    albuterol 108 (90 Base) MCG/ACT Inhalation Aero Soln Inhale 1 puff into the lungs every 6 (six) hours as needed for Wheezing. 54 g 1    Budesonide-Formoterol Fumarate (SYMBICORT) 160-4.5 MCG/ACT Inhalation Aerosol Inhale 2 puffs into the lungs 2 (two) times daily. 10.2 g 11    montelukast 10 MG Oral Tab Take 1 tablet (10 mg total) by mouth nightly. 90 tablet 3    loratadine 10 MG Oral Tab Take 1 tablet (10 mg total) by mouth daily. 90 tablet 11    VITAMIN D OR Take 1 tablet by mouth daily. (Patient not taking: Reported on 7/21/2025)

## 2025-07-21 NOTE — ASSESSMENT & PLAN NOTE
S/p injury from football in 11/2024   MRI and R knee surgery did not provide answers, per patient/mom  Currently in PT  Aware should avoid football at this time

## 2025-07-22 NOTE — TELEPHONE ENCOUNTER
Dr. Kim, Symbicort is not preferred.     Message: drug not covered by patient plan. The preferred alternative is ADVAIRHFA, BREOELLIPTAPLANHELPDESKNUMBER. Please call fax the pharmacy to change medication along with strength, directions, quantity, and refills.

## 2025-07-22 NOTE — TELEPHONE ENCOUNTER
Medications - Current[1]  Budesonide-Formoterol Fumarate (SYMBICORT) 160-4.5 MCG/ACT Inhalation Aerosol, Inhale 2 puffs into the lungs 2 (two) times daily., Disp: 10.2 g, Rfl: 11       Message: drug not covered by patient plan. The preferred alternative is ADVAIRHFA, SAADOELLIPTAPLANHELPDASAEL. Please call fax the pharmacy to change medication along with strength, directions, quantity, and refills.        [1]   Current Outpatient Medications:     albuterol 108 (90 Base) MCG/ACT Inhalation Aero Soln, Inhale 1 puff into the lungs every 6 (six) hours as needed for Wheezing., Disp: 54 g, Rfl: 1    Budesonide-Formoterol Fumarate (SYMBICORT) 160-4.5 MCG/ACT Inhalation Aerosol, Inhale 2 puffs into the lungs 2 (two) times daily., Disp: 10.2 g, Rfl: 11    montelukast 10 MG Oral Tab, Take 1 tablet (10 mg total) by mouth nightly., Disp: 90 tablet, Rfl: 3    loratadine 10 MG Oral Tab, Take 1 tablet (10 mg total) by mouth daily., Disp: 90 tablet, Rfl: 11    VITAMIN D OR, Take 1 tablet by mouth daily. (Patient not taking: Reported on 7/21/2025), Disp: , Rfl:

## (undated) NOTE — LETTER
1/18/2022              [facility] Quispe        [phone]         To Whom It May Concern,    Luis Guthrie is currently under my medical care.  Please excuse Chalo Patterson from school today, 1/18/22, as he was in our o

## (undated) NOTE — LETTER
7/12/2022              [address] [place] [id number]         To Whom It May Concern,    Rajeev's mother has been battling with pancreatic cancer and patient has depression. If you have any questions or concerns please let us know       Sincerely,    Salem Seip, MD [place] , [facility] [place] [address]  [phone]        Document electronically generated by:   Tea Madden

## (undated) NOTE — LETTER
7/12/2022              [phone]         To Whom It May Concern,      Jorje Banegas was seen in office today and is being treated for his right foot injury. Please allow him to do light duty work and sitting down work only until MRI has been completed. If you have any questions or concerns please call the office for further assistance. Sincerely,    MD Concepción Morel [place] , [facility] [place] [address]  [phone]        Document electronically generated by:   Everett Frederick

## (undated) NOTE — LETTER
Date & Time: 1/11/2022, 3:51 PM  Patient: Marivel Gutierres  Encounter Provider(s):    Rohan Victor MD       To Whom It May Concern:    Marivel Gutierres was seen and treated in our department on 1/11/2022.  He can return to school 1/12

## (undated) NOTE — ED AVS SNAPSHOT
Josephine Armenta   MRN: [id number]    Department:  [facility] Emergency Department   Date of Visit:  11/20/2018           Disclosure     Insurance plans vary and the physician(s) referred by the ER may not be covered by your plan.  Please co CARE PHYSICIAN AT ONCE OR RETURN IMMEDIATELY TO THE EMERGENCY DEPARTMENT. If you have been prescribed any medication(s), please fill your prescription right away and begin taking the medication(s) as directed.   If you believe that any of the medications

## (undated) NOTE — LETTER
7/12/2022              Ericka Caba        [phone]         To Whom It May Concern,    Jono Acosta was seen in office today and is being treated for his right foot injury. Please excuse him from work until his MRI has been completed and is cleared to return to work. If you have any questions or concerns please call the office for further assistance. Sincerely,    MD Concepción Boss [place] , [facility] [place] [address]  [phone]        Document electronically generated by:   Netroundste Less

## (undated) NOTE — LETTER
Date & Time: 1/8/2022, 11:08 AM  Patient: Shae Salgado  Encounter Provider(s):    ROXANNE Islas       To Whom It May Concern:    Shae Salgado was seen and treated in our department on 1/6/2022.  He should not return to sc

## (undated) NOTE — LETTER
Date & Time: 3/11/2024, 7:33 PM  Patient: Rajeev Traore  Encounter Provider(s):    Sejal Mckenzie APRN       To Whom It May Concern:    Rajeev Traore was seen and treated in our department on 3/11/2024. He should not return to school until 03/13/2024 .    If you have any questions or concerns, please do not hesitate to call.        _____________________________  Physician/APC Signature

## (undated) NOTE — LETTER
Date & Time: 5/27/2022, 9:06 PM  Patient: Nitish Armijo  Encounter Provider(s):    ROXANNE Anders       To Whom It May Concern:    Antonella Becerra was seen and treated in our department on 5/27/2022.  He should not participate in gym/sports until follow up and further advisement from orthopedic MD..    If you have any questions or concerns, please do not hesitate to call.        _____________________________  Physician/APC Signature

## (undated) NOTE — LETTER
6/17/2022          To Whom It May Concern:    Shauna Joy is currently under my medical care and may not return to work for the next 3 weeks. If you require additional information please contact our office.         Sincerely,    Brayden Ha MD

## (undated) NOTE — LETTER
Date & Time: 7/15/2024, 12:24 PM  Patient: Rajeev Traore  Encounter Provider(s):    Rey Ferreira PA       To Whom It May Concern:    Rajeev Traore was seen and treated in our department on 07/15/2024. He was seen in our department for evaluation of cold/flulike symptoms. COVID PCR testing is Positive. He instructed to quarantine until Thursday, July 18, 2024. May return on this date if he has no fever and has improvement of symptoms.    Dx: COVID virus infection, Initial Encounter        If you have any questions or concerns, please do not hesitate to call.        _____________________________  Physician/APC Signature